# Patient Record
Sex: MALE | Race: WHITE | NOT HISPANIC OR LATINO | Employment: STUDENT | URBAN - METROPOLITAN AREA
[De-identification: names, ages, dates, MRNs, and addresses within clinical notes are randomized per-mention and may not be internally consistent; named-entity substitution may affect disease eponyms.]

---

## 2021-01-30 DIAGNOSIS — Z23 ENCOUNTER FOR IMMUNIZATION: ICD-10-CM

## 2021-02-03 ENCOUNTER — IMMUNIZATIONS (OUTPATIENT)
Dept: FAMILY MEDICINE CLINIC | Facility: HOSPITAL | Age: 21
End: 2021-02-03

## 2021-02-03 DIAGNOSIS — Z23 ENCOUNTER FOR IMMUNIZATION: Primary | ICD-10-CM

## 2021-02-03 PROCEDURE — 91301 SARS-COV-2 / COVID-19 MRNA VACCINE (MODERNA) 100 MCG: CPT

## 2021-02-03 PROCEDURE — 0011A SARS-COV-2 / COVID-19 MRNA VACCINE (MODERNA) 100 MCG: CPT

## 2021-03-04 ENCOUNTER — IMMUNIZATIONS (OUTPATIENT)
Dept: FAMILY MEDICINE CLINIC | Facility: HOSPITAL | Age: 21
End: 2021-03-04

## 2021-03-04 DIAGNOSIS — Z23 ENCOUNTER FOR IMMUNIZATION: Primary | ICD-10-CM

## 2021-03-04 PROCEDURE — 0012A SARS-COV-2 / COVID-19 MRNA VACCINE (MODERNA) 100 MCG: CPT

## 2021-03-04 PROCEDURE — 91301 SARS-COV-2 / COVID-19 MRNA VACCINE (MODERNA) 100 MCG: CPT

## 2025-03-24 ENCOUNTER — OFFICE VISIT (OUTPATIENT)
Dept: PHYSICAL THERAPY | Facility: REHABILITATION | Age: 25
End: 2025-03-24
Payer: COMMERCIAL

## 2025-03-24 DIAGNOSIS — M25.562 ACUTE PAIN OF LEFT KNEE: Primary | ICD-10-CM

## 2025-03-24 PROCEDURE — 97161 PT EVAL LOW COMPLEX 20 MIN: CPT | Performed by: PHYSICAL THERAPIST

## 2025-03-24 PROCEDURE — 97110 THERAPEUTIC EXERCISES: CPT | Performed by: PHYSICAL THERAPIST

## 2025-03-24 NOTE — PROGRESS NOTES
PT Evaluation     Today's date: 3/24/2025  Patient name: Kang Escalera  : 2000  MRN: [id number]  Referring provider: Emily Olivares PT  Dx:   Encounter Diagnosis     ICD-10-CM    1. Acute pain of left knee  M25.562           Start Time: 725  Stop Time: 755  Total time in clinic (min): 30 minutes    Assessment  Impairments: impaired physical strength and lacks appropriate home exercise program    Assessment details: Patient has decreased strength LLE compared to right and would benefit from strengthening    Goals  STG decrease pain 0/10  Increase strength 4+/5  LTG I ADL's and return to sports    Plan  Patient would benefit from: skilled physical therapy    Planned therapy interventions: strengthening    Frequency: 1x week  Duration in weeks: 4  Treatment plan discussed with: patient  Plan details: Check in with patient in 2 weeks to see how he is doing        Subjective Evaluation    History of Present Illness  Mechanism of injury: Patient presents direct access with left knee pain that started 3/6 after running a long distance then biking, golf the day before  with pain on the outside of the knee while running with buckling and pain on the outside of the knee  Patient Goals  Patient goals for therapy: decreased pain and independence with ADLs/IADLs    Pain  Current pain ratin  At best pain ratin  At worst pain ratin  Quality: dull ache          Objective     Strength/Myotome Testing     Left Hip   Planes of Motion   Abduction: 4  Adduction: 3+    Left Knee   Flexion: 4  Prone flexion: 4    Right Knee   Flexion: 4+  Extension: 4+    Swelling     Left Knee Girth Measurement (cm)   Joint line: 39.1 cm  10 cm above joint line: 44 cm  10 cm below joint line: 35.2 cm    Right Knee Girth Measurement (cm)   Joint line: 39.7 cm  10 cm above joint line: 44.5 cm  10 cm below joint line: 36 cm             Precautions: negative      Manuals 3/24                                                                 Neuro Re-Ed                                                                                                        Ther Ex             Slr left X 20            Hip abduction sideling  X 20            Hip adduction X 20            Hip extension X 20                                                                Ther Activity                                       Gait Training                                       Modalities                                        Access Code: [email or web address]  URL: [email or web address]  Date: 03/24/2025  Prepared by: Emiyl Olivares    Exercises  - Supine Active Straight Leg Raise  - 1 x daily - 7 x weekly - 1 sets - 20 reps  - Prone Hip Extension  - 1 x daily - 7 x weekly - 1 sets - 20 reps  - Sidelying Hip Adduction  - 1 x daily - 7 x weekly - 1 sets - 20 reps  - Sidelying Hip Abduction  - 1 x daily - 7 x weekly - 1 sets - 20 reps